# Patient Record
Sex: MALE | Race: WHITE | URBAN - METROPOLITAN AREA
[De-identification: names, ages, dates, MRNs, and addresses within clinical notes are randomized per-mention and may not be internally consistent; named-entity substitution may affect disease eponyms.]

---

## 2018-08-31 ENCOUNTER — OFFICE VISIT (OUTPATIENT)
Dept: NEUROLOGY | Age: 38
End: 2018-08-31

## 2018-08-31 VITALS
OXYGEN SATURATION: 97 % | WEIGHT: 219.2 LBS | HEIGHT: 71 IN | BODY MASS INDEX: 30.69 KG/M2 | TEMPERATURE: 98.2 F | HEART RATE: 68 BPM | DIASTOLIC BLOOD PRESSURE: 90 MMHG | RESPIRATION RATE: 20 BRPM | SYSTOLIC BLOOD PRESSURE: 124 MMHG

## 2018-08-31 DIAGNOSIS — G56.22 ULNAR NEUROPATHY OF LEFT UPPER EXTREMITY: Primary | ICD-10-CM

## 2018-08-31 NOTE — PROGRESS NOTES
[facility] [address] [place], [address]  [phone]      [place] [address]    Neurophysiology Report      Patient: Klaus Holcomb     ID: [id number] Physician: Destiny Benitez MD   Gender: Male Ref Phys: Dr. Estefania Winston   Handedness: Layo Jha     Study Date: August 31, 2018       t   Patient History:  31 y/o male with a one month history of numbness in the tips of the left 4th and 5th digits. Nerve Conduction Studies  Anti Sensory Summary Table     Stim Site NR Peak (ms) Norm Peak (ms) O-P Amp (µV) Norm O-P Amp Dist (cm) García (m/s)   Left Median 2nd Digit Anti Sensory (2nd Digit)   Wrist    3.5 <3.5 27.1 >20 13.0 52.0   Right Median 2nd Digit Anti Sensory (2nd Digit)   Wrist    3.4 <3.5 22.9 >20 13.0 46.4   Site 2    3.5  26.7      Left Ulnar Anti Sensory (5th Digit )   Wrist    3.7 <3.1 16.7 >17.0 11.0 45.8   Right Ulnar Anti Sensory (5th Digit )   Wrist    3.1 <3.1 23.3 >17.0 11.0 57.9   Site 2    3.1  42.9        Motor Summary Table     Stim Site NR Onset (ms) Norm Onset (ms) O-P Amp (mV) Norm O-P Amp Dist (cm) García (m/s) Norm Garcaí (m/s)   Left Median Motor (Abd Poll Brev)   Wrist    3.4 <4.4 9.2 >4.0 26.0 66.7 >49   Elbow    7.3  9.1       Right Median Motor (Abd Poll Brev)   Wrist    3.5 <4.4 7.2 >4.0 26.0 60.5 >49   Elbow    7.8  6.9       Left Ulnar Motor (Abd Dig Minimi )   Wrist    2.7 <3.3 9.4 >6.0 23.0 63.9 >49   B Elbow    6.3  8.8  14.0 63.6 >50   A Elbow    8.5  8.8       Right Ulnar Motor (Abd Dig Minimi )   Wrist    2.9 <3.3 9.8 >6.0 23.0 65.7 >49   B Elbow    6.4  8.5  13.0 56.5 >50   A Elbow    8.7  8.4           NCS/EMG FINDINGS:     Evaluation of the Left median motor, the Right median motor, the Left ulnar motor, the Right ulnar motor, the Left Median 2nd Digit sensory, and the Right ulnar sensory nerves were unremarkable.  The Right Median 2nd Digit sensory nerve showed decreased conduction velocity (Wrist-2nd Digit, 46.4 m/s).    The Left ulnar sensory nerve showed prolonged distal peak latency (3.7 ms), reduced amplitude (16.7 µV), and decreased conduction velocity (Wrist-5th Digit, 45.8 m/s). INTERPRETATION:   Impression:   Normal nerve conduction studies of both arms.  There is no evidence of a median neuropathy or ulnar nerve entrapment.      ___________________________  Genette MD Thien          Waveforms:

## 2018-08-31 NOTE — PROGRESS NOTES
8/31/2018 12:40 PM    SSN: [id number]    Subjective: 31-year-old male referred by Dr. Anjali Arnold for evaluation of left hand numbness. He reports that for about a month he has had numbness in the left fourth and fifth digits, pointing to the half distal portion of both these fingers as the parts that are involved. It does not radiate any proximally. He does notice that sometimes he gets numbness when he taps in his medial elbow. He had back surgery a few months ago and he reports that he has been propping his elbows up more lately. Denies any weakness. Denies any shooting pains from the neck or neck pain at all. Denies any right upper extremity symptoms. Nerve conduction studies of bilateral upper extremities today are completely within normal limits. Social History     Social History    Marital status:      Spouse name: N/A    Number of children: N/A    Years of education: N/A     Occupational History    Not on file. Social History Main Topics    Smoking status: Never Smoker    Smokeless tobacco: Never Used    Alcohol use No    Drug use: Not on file    Sexual activity: Not on file     Other Topics Concern    Not on file     Social History Narrative    No narrative on file       Family history: No pertinent past family history.   No past history in the family of neuropathy      Past medical history: No history of hypertension, diabetes, or other general illnesses    Social history: Denies excessive drinking    Surgical history: Lumbar surgery    No Known Allergies    Vital signs:    Visit Vitals    /90 (BP 1 Location: Left arm, BP Patient Position: Sitting)    Pulse 68    Temp 98.2 °F (36.8 °C) (Oral)    Resp 20    Ht 5' 11\" (1.803 m)    Wt 99.4 kg (219 lb 3.2 oz)    SpO2 97%    BMI 30.57 kg/m2       Review of Systems:   GENERAL: Denies fever or fatigue  CARDIAC: No CP or SOB  PULMONARY: No cough of SOB  MUSCULOSKELETAL: No new joint pain  NEURO: SEE HPI      GENERAL: Well developed, well nourished, in no apparent distress. HEART: RR, no murmurs heard, no carotid bruits  EXTREMITIES: No clubbing, cyanosis, or edema is identified. Pulses 2+    and symmetrical.  HEAD:   Normocephalic, atraumatic. NEUROLOGIC EXAMINATION    MENTAL STATUS: Awake, alert, and oriented x 4. Attention and STM are grossly normal. There is no aphasia. Fund of knowledge is adequate. Mood and affect are appropriate  CRANIAL NERVES: Visual fields are full to confrontation. Pupils are reactive to light and accommodation. Extraocular movements are intact and there is no nystagmus. Face is symmetrical.   Hearing is present. MOTOR:  5/5 in all four limbs    CEREBELLAR: No tremors     SENSORY: Normal PP, vibration     DTR's: +2 throughout     GAIT:   Normal gait          Assessment/Plan: Distal left fourth and fifth digit numbness is recent, electrodiagnostic testing and examinations are normal, this may be either a distal digital nerve problem of the hands or potentially a very early left ulnar neuropathy at the elbow that has not fully evolved and certainly is not severe enough to be aggressive in its treatment. I advised him to avoid propping his elbows and putting pressure on the medial elbow for now. I advised him to return if his symptoms are getting worse for reevaluation. PLEASE NOTE:   Portions of this document may have been produced using voice recognition software. Unrecognized errors in transcription may be present. This note will not be viewable in [address]